# Patient Record
Sex: FEMALE | Race: WHITE | Employment: UNEMPLOYED | URBAN - METROPOLITAN AREA
[De-identification: names, ages, dates, MRNs, and addresses within clinical notes are randomized per-mention and may not be internally consistent; named-entity substitution may affect disease eponyms.]

---
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
## 2018-05-23 ENCOUNTER — ROUTINE PRENATAL (OUTPATIENT)
Dept: OBGYN CLINIC | Age: 18
End: 2018-05-23
Payer: COMMERCIAL

## 2018-05-23 VITALS
HEART RATE: 102 BPM | WEIGHT: 175 LBS | SYSTOLIC BLOOD PRESSURE: 92 MMHG | DIASTOLIC BLOOD PRESSURE: 62 MMHG | BODY MASS INDEX: 32.01 KG/M2

## 2018-05-23 DIAGNOSIS — O46.8X2 SUBCHORIONIC HEMATOMA IN SECOND TRIMESTER, SINGLE OR UNSPECIFIED FETUS: ICD-10-CM

## 2018-05-23 DIAGNOSIS — Z36.89 ENCOUNTER FOR FETAL ANATOMIC SURVEY: ICD-10-CM

## 2018-05-23 DIAGNOSIS — Z3A.28 28 WEEKS GESTATION OF PREGNANCY: ICD-10-CM

## 2018-05-23 DIAGNOSIS — O36.63X0 EXCESSIVE FETAL GROWTH AFFECTING MANAGEMENT OF MOTHER IN SINGLETON PREGNANCY IN THIRD TRIMESTER, ANTEPARTUM: ICD-10-CM

## 2018-05-23 DIAGNOSIS — O41.8X20 SUBCHORIONIC HEMATOMA IN SECOND TRIMESTER, SINGLE OR UNSPECIFIED FETUS: ICD-10-CM

## 2018-05-23 DIAGNOSIS — O99.213 OBESITY AFFECTING PREGNANCY IN THIRD TRIMESTER: Primary | ICD-10-CM

## 2018-05-23 LAB
GLUCOSE URINE, POC: NORMAL
PROTEIN UA: POSITIVE

## 2018-05-23 PROCEDURE — 99214 OFFICE O/P EST MOD 30 MIN: CPT | Performed by: OBSTETRICS & GYNECOLOGY

## 2018-05-23 PROCEDURE — 1036F TOBACCO NON-USER: CPT | Performed by: OBSTETRICS & GYNECOLOGY

## 2018-05-23 PROCEDURE — 99211 OFF/OP EST MAY X REQ PHY/QHP: CPT | Performed by: OBSTETRICS & GYNECOLOGY

## 2018-05-23 PROCEDURE — 81002 URINALYSIS NONAUTO W/O SCOPE: CPT | Performed by: OBSTETRICS & GYNECOLOGY

## 2018-05-23 PROCEDURE — G8417 CALC BMI ABV UP PARAM F/U: HCPCS | Performed by: OBSTETRICS & GYNECOLOGY

## 2018-05-23 PROCEDURE — 76819 FETAL BIOPHYS PROFIL W/O NST: CPT | Performed by: OBSTETRICS & GYNECOLOGY

## 2018-05-23 PROCEDURE — G8427 DOCREV CUR MEDS BY ELIG CLIN: HCPCS | Performed by: OBSTETRICS & GYNECOLOGY

## 2018-05-23 PROCEDURE — 76811 OB US DETAILED SNGL FETUS: CPT | Performed by: OBSTETRICS & GYNECOLOGY

## 2018-05-24 ENCOUNTER — HOSPITAL ENCOUNTER (OUTPATIENT)
Age: 18
Discharge: HOME | End: 2018-05-24
Payer: COMMERCIAL

## 2018-05-24 DIAGNOSIS — Z3A.28 28 WEEKS GESTATION OF PREGNANCY: ICD-10-CM

## 2018-05-24 DIAGNOSIS — O99.213 OBESITY AFFECTING PREGNANCY IN THIRD TRIMESTER: ICD-10-CM

## 2018-05-24 LAB
GLUCOSE TOLERANCE TEST 1 HOUR: 156 MG/DL
GLUCOSE TOLERANCE TEST 2 HOUR: 105 MG/DL
GLUCOSE TOLERANCE TEST FASTING: 83 MG/DL

## 2018-05-24 PROCEDURE — 36415 COLL VENOUS BLD VENIPUNCTURE: CPT

## 2018-05-24 PROCEDURE — 82951 GLUCOSE TOLERANCE TEST (GTT): CPT

## 2018-06-13 ENCOUNTER — ROUTINE PRENATAL (OUTPATIENT)
Dept: OBGYN CLINIC | Age: 18
End: 2018-06-13
Payer: COMMERCIAL

## 2018-06-13 VITALS
HEART RATE: 112 BPM | DIASTOLIC BLOOD PRESSURE: 79 MMHG | BODY MASS INDEX: 32.19 KG/M2 | WEIGHT: 176 LBS | SYSTOLIC BLOOD PRESSURE: 119 MMHG

## 2018-06-13 DIAGNOSIS — O99.213 OBESITY AFFECTING PREGNANCY IN THIRD TRIMESTER: ICD-10-CM

## 2018-06-13 DIAGNOSIS — Z3A.31 31 WEEKS GESTATION OF PREGNANCY: ICD-10-CM

## 2018-06-13 DIAGNOSIS — O46.8X1 SUBCHORIONIC HEMATOMA IN FIRST TRIMESTER, SINGLE OR UNSPECIFIED FETUS: ICD-10-CM

## 2018-06-13 DIAGNOSIS — O41.8X10 SUBCHORIONIC HEMATOMA IN FIRST TRIMESTER, SINGLE OR UNSPECIFIED FETUS: ICD-10-CM

## 2018-06-13 DIAGNOSIS — O36.63X0 EXCESSIVE FETAL GROWTH AFFECTING MANAGEMENT OF MOTHER IN SINGLETON PREGNANCY IN THIRD TRIMESTER, ANTEPARTUM: Primary | ICD-10-CM

## 2018-06-13 LAB
GLUCOSE URINE, POC: NORMAL
PROTEIN UA: POSITIVE

## 2018-06-13 PROCEDURE — 76818 FETAL BIOPHYS PROFILE W/NST: CPT | Performed by: OBSTETRICS & GYNECOLOGY

## 2018-06-13 PROCEDURE — 99214 OFFICE O/P EST MOD 30 MIN: CPT | Performed by: OBSTETRICS & GYNECOLOGY

## 2018-06-13 PROCEDURE — G8417 CALC BMI ABV UP PARAM F/U: HCPCS | Performed by: OBSTETRICS & GYNECOLOGY

## 2018-06-13 PROCEDURE — 76816 OB US FOLLOW-UP PER FETUS: CPT | Performed by: OBSTETRICS & GYNECOLOGY

## 2018-06-13 PROCEDURE — 99211 OFF/OP EST MAY X REQ PHY/QHP: CPT | Performed by: OBSTETRICS & GYNECOLOGY

## 2018-06-13 PROCEDURE — 1036F TOBACCO NON-USER: CPT | Performed by: OBSTETRICS & GYNECOLOGY

## 2018-06-13 PROCEDURE — G8427 DOCREV CUR MEDS BY ELIG CLIN: HCPCS | Performed by: OBSTETRICS & GYNECOLOGY

## 2018-06-13 PROCEDURE — 81002 URINALYSIS NONAUTO W/O SCOPE: CPT | Performed by: OBSTETRICS & GYNECOLOGY

## 2018-07-02 ENCOUNTER — ROUTINE PRENATAL (OUTPATIENT)
Dept: OBGYN CLINIC | Age: 18
End: 2018-07-02
Payer: COMMERCIAL

## 2018-07-02 VITALS
BODY MASS INDEX: 32.76 KG/M2 | SYSTOLIC BLOOD PRESSURE: 110 MMHG | HEIGHT: 62 IN | DIASTOLIC BLOOD PRESSURE: 73 MMHG | HEART RATE: 98 BPM | WEIGHT: 178 LBS

## 2018-07-02 DIAGNOSIS — O46.8X1 SUBCHORIONIC HEMATOMA IN FIRST TRIMESTER, SINGLE OR UNSPECIFIED FETUS: ICD-10-CM

## 2018-07-02 DIAGNOSIS — O41.8X10 SUBCHORIONIC HEMATOMA IN FIRST TRIMESTER, SINGLE OR UNSPECIFIED FETUS: ICD-10-CM

## 2018-07-02 DIAGNOSIS — O36.63X0 EXCESSIVE FETAL GROWTH AFFECTING MANAGEMENT OF MOTHER IN SINGLETON PREGNANCY IN THIRD TRIMESTER, ANTEPARTUM: ICD-10-CM

## 2018-07-02 DIAGNOSIS — Z3A.34 34 WEEKS GESTATION OF PREGNANCY: ICD-10-CM

## 2018-07-02 DIAGNOSIS — O99.213 OBESITY AFFECTING PREGNANCY IN THIRD TRIMESTER: Primary | ICD-10-CM

## 2018-07-02 LAB
GLUCOSE URINE, POC: NEGATIVE
PROTEIN UA: NEGATIVE

## 2018-07-02 PROCEDURE — 1036F TOBACCO NON-USER: CPT | Performed by: OBSTETRICS & GYNECOLOGY

## 2018-07-02 PROCEDURE — 76815 OB US LIMITED FETUS(S): CPT | Performed by: OBSTETRICS & GYNECOLOGY

## 2018-07-02 PROCEDURE — 99211 OFF/OP EST MAY X REQ PHY/QHP: CPT | Performed by: OBSTETRICS & GYNECOLOGY

## 2018-07-02 PROCEDURE — G8417 CALC BMI ABV UP PARAM F/U: HCPCS | Performed by: OBSTETRICS & GYNECOLOGY

## 2018-07-02 PROCEDURE — G8427 DOCREV CUR MEDS BY ELIG CLIN: HCPCS | Performed by: OBSTETRICS & GYNECOLOGY

## 2018-07-02 PROCEDURE — 81002 URINALYSIS NONAUTO W/O SCOPE: CPT | Performed by: OBSTETRICS & GYNECOLOGY

## 2018-07-02 PROCEDURE — 99213 OFFICE O/P EST LOW 20 MIN: CPT | Performed by: OBSTETRICS & GYNECOLOGY

## 2018-07-02 PROCEDURE — 76818 FETAL BIOPHYS PROFILE W/NST: CPT | Performed by: OBSTETRICS & GYNECOLOGY

## 2018-07-02 NOTE — LETTER
18     RE:  Rose Heard   : 2000   AGE: 21 y.o. MD Aleja Egan MD       [address]  250 [facility], [facility]    Dear Dutch Kilgore and Erik:  Mrs. Rose Heard a 21 y.o.  [id number]  is seen today on follow up in our office. REASON FOR APPOINTMENT:  1. To check on fetal well being anatomy and growth. 2. Vaginal Bleeding, and Subchorionic Hematoma, noted on 7 weeks ultrasound. 3. Previous pregnancy loss at 25 weeks (complicated by subchorionic hematoma and elevated MSAFP). MEDICATIONS:    Prenatal Vitamins    INTERVAL HISTORY:  Mrs Rose Heard had an uneventful course of pregnancy since her last visit to our office. When seen today in our office she had no complaints. PHYSICAL EXAMINATION:  General Appearance:  Healthy looking, alert, no acute distress. Eyes:     No pallor, no icterus, no photophobia. Ears:     No ear drainage. Nose:     No nasal drainage, no paranasal sinus tenderness. Throat:   Mucosa moist, no oral thrush, no exudate. Neck:     No nuchal rigidity. Back:     No CVA tenderness. Abdomen:    Soft nontender. Extremities:    No pretibial pitting edema, no calf muscle tenderness. Skin:     No rashes, no lesions. BP: 110/73 Weight: 178 lb (80.7 kg) Height: 5' 2\" (157.5 cm) Pulse: 98     Body mass index is 32.56 kg/m². Urine dipstick:  Glucose : Negative   Albumin:  Trace       An ultrasound evaluation was done in our office today. Please refer to the enclosed copy of the ultrasound report for further information. RE:  Rose Heard                      Page: 2  18    IMPRESSION:  1. A  34w2d  intrauterine gestation. 2. Maternal obesity. 3. Excessive fetal growth. 4. Previous Subchorionic Hematoma, resolved. 5. Previous pregnancy loss at 25 weeks (complicated by subchorionic hematoma and elevated MSAFP).     RECOMMENDATIONS/PLAN:  I discussed with the patient the following points:

## 2018-07-02 NOTE — PATIENT INSTRUCTIONS
Any questions contact [place] at [phone]. Call your primary obstetrician with bleeding, leaking of fluid, abdominal tenderness, headache, blurry vision, epigastric pain and increased urinary frequency. Do kick counts after dinner. Call your primary obstetrician if less than 10 kicks in 2 hours after dinner. Call your primary obstetrician with bleeding, leaking of fluid, abdominal tenderness, headache, blurry vision, epigastric pain and increased urinary frequency. Patient Education        Weeks 34 to 39 of Your Pregnancy: Care Instructions  Your Care Instructions    By now, your baby and your belly have grown quite large. It is almost time to give birth. A full-term pregnancy can deliver between 37 and 42 weeks. Your baby's lungs are almost ready to breathe air. The bones in your baby's head are now firm enough to protect it, but soft enough to move down through the birth canal.  You may feel excited, happy, anxious, or scared. You may wonder how you will know if you are in labor or what to expect during labor. Try to be flexible in your expectations of the birth. Because each birth is different, there is no way to know exactly what childbirth will be like for you. This care sheet will help you know what to expect and how to prepare. This may make your childbirth easier. If you haven't already had the Tdap shot during this pregnancy, talk to your doctor about getting it. It will help protect your  against pertussis infection. In the 36th week, most women have a test for group B streptococcus (GBS). GBS is a common bacteria that can live in the vagina and rectum. It can make your baby sick after birth. If you test positive, you will get antibiotics during labor. The medicine will keep your baby from getting the bacteria. Follow-up care is a key part of your treatment and safety. Be sure to make and go to all appointments, and call your doctor if you are having problems.  It's also a good idea to know your test results and keep a list of the medicines you take. How can you care for yourself at home? Learn about pain relief choices  · Pain is different for every woman. Talk with your doctor about your feelings about pain. · You can choose from several types of pain relief. These include medicine or breathing techniques, as well as comfort measures. You can use more than one option. · If you choose to have pain medicine during labor, talk to your doctor about your options. Some medicines lower anxiety and help with some of the pain. Others make your lower body numb so that you won't feel pain. · Be sure to tell your doctor about your pain medicine choice before you start labor or very early in your labor. You may be able to change your mind as labor progresses. · Rarely, a woman is put to sleep by medicine given through a mask or an IV. Labor and delivery  · The first stage of labor has three parts: early, active, and transition. ¨ Most women have early labor at home. You can stay busy or rest, eat light snacks, drink clear fluids, and start counting contractions. ¨ When talking during a contraction gets hard, you may be moving to active labor. During active labor, you should head for the hospital if you are not there already. ¨ You are in active labor when contractions come every 3 to 4 minutes and last about 60 seconds. Your cervix is opening more rapidly. ¨ If your water breaks, contractions will come faster and stronger. ¨ During transition, your cervix is stretching, and contractions are coming more rapidly. ¨ You may want to push, but your cervix might not be ready. Your doctor will tell you when to push. · The second stage starts when your cervix is completely opened and you are ready to push. ¨ Contractions are very strong to push the baby down the birth canal.  ¨ You will feel the urge to push. You may feel like you need to have a bowel movement.   ¨ You may be coached to push with contractions. These contractions will be very strong, but you will not have them as often. You can get a little rest between contractions. ¨ You may be emotional and irritable. You may not be aware of what is going on around you. ¨ One last push, and your baby is born. · The third stage is when a few more contractions push out the placenta. This may take 30 minutes or less. · The fourth stage is the welcome recovery. You may feel overwhelmed with emotions and exhausted but alert. This is a good time to start breastfeeding. Where can you learn more? Go to [email or web address] and sign in to your Blockchain account. Enter M088 in the [facility] box to learn more about \"Weeks 34 to 36 of Your Pregnancy: Care Instructions. \"     If you do not have an account, please click on the \"Sign Up Now\" link. Current as of: November 21, 2017  Content Version: 11.6  © 4105-8406 [employer]. Care instructions adapted under license by [facility] ([facility]). If you have questions about a medical condition or this instruction, always ask your healthcare professional. [place] [address] any warranty or liability for your use of this information. Patient Education        Learning About When to Call Your Doctor During Pregnancy (After 20 Weeks)  Your Care Instructions  It's common to have concerns about what might be a problem during pregnancy. Although most pregnant women don't have any serious problems, it's important to know when to call your doctor if you have certain symptoms or signs of labor. These are general suggestions. Your doctor may give you some more information about when to call. When to call your doctor (after 20 weeks)  Call 911 anytime you think you may need emergency care. For example, call if:  · You have severe vaginal bleeding. · You have sudden, severe pain in your belly. · You passed out (lost consciousness). · You have a seizure.   · You see or feel the umbilical cord. · You think you are about to deliver your baby and can't make it safely to the hospital.  Call your doctor now or seek immediate medical care if:  · You have vaginal bleeding. · You have belly pain. · You have a fever. · You have symptoms of preeclampsia, such as:  ¨ Sudden swelling of your face, hands, or feet. ¨ New vision problems (such as dimness or blurring). ¨ A severe headache. · You have a sudden release of fluid from your vagina. (You think your water broke.)  · You think that you may be in labor. This means that you've had at least 4 contractions within 20 minutes or at least 8 contractions in an hour. · You notice that your baby has stopped moving or is moving much less than normal.  · You have symptoms of a urinary tract infection. These may include:  ¨ Pain or burning when you urinate. ¨ A frequent need to urinate without being able to pass much urine. ¨ Pain in the flank, which is just below the rib cage and above the waist on either side of the back. ¨ Blood in your urine. Watch closely for changes in your health, and be sure to contact your doctor if:  · You have vaginal discharge that smells bad. · You have skin changes, such as:  ¨ A rash. ¨ Itching. ¨ Yellow color to your skin. · You have other concerns about your pregnancy. If you have labor signs at 37 weeks or more  If you have signs of labor at 37 weeks or more, your doctor may tell you to call when your labor becomes more active. Symptoms of active labor include:  · Contractions that are regular. · Contractions that are less than 5 minutes apart. · Contractions that are hard to talk through. Follow-up care is a key part of your treatment and safety. Be sure to make and go to all appointments, and call your doctor if you are having problems. It's also a good idea to know your test results and keep a list of the medicines you take. Where can you learn more?   Go to [email or web address] and sign in to your Research & Innovation account. Enter  in the [facility] box to learn more about \"Learning About When to Call Your Doctor During Pregnancy (After 20 Weeks). \"     If you do not have an account, please click on the \"Sign Up Now\" link. Current as of: November 21, 2017  Content Version: 11.6  © 5225-0760 [employer]. Care instructions adapted under license by [facility] ([facility]). If you have questions about a medical condition or this instruction, always ask your healthcare professional. [place] [address] any warranty or liability for your use of this information. Patient Education        Counting Your Baby's Kicks: Care Instructions  Your Care Instructions    Counting your baby's kicks is one way your doctor can tell that your baby is healthy. Most women-especially in a first pregnancy-feel their baby move for the first time between 16 and 22 weeks. The movement may feel like flutters rather than kicks. Your baby may move more at certain times of the day. When you are active, you may notice less kicking than when you are resting. At your prenatal visits, your doctor will ask whether the baby is active. In your last trimester, your doctor may ask you to count the number of times you feel your baby move. Follow-up care is a key part of your treatment and safety. Be sure to make and go to all appointments, and call your doctor if you are having problems. It's also a good idea to know your test results and keep a list of the medicines you take. How do you count fetal kicks? · A common method of checking your baby's movement is to count the number of kicks or moves you feel in 1 hour. Ten movements (such as kicks, flutters, or rolls) in 1 hour are normal. Some doctors suggest that you count in the morning until you get to 10 movements. Then you can quit for that day and start again the next day.   · Pick your baby's most active

## 2018-07-03 NOTE — PROGRESS NOTES
NST completed. Baseline 140 with moderate variability+ accelerations.  Reactive per dr Mishel Roe
Previous subchorionic hematoma. INDICATION:              Previous Intrauterine fetal demise      TIME ON:  1:31 pm      TIME OFF:  1:52 PM      RESULT:   REACTIVE      FHR Baseline Rate:   140 bpm    PERIODIC CHANGES:    · Accelerations present, variability moderate, no decelerations noted    COMMENTS:      She should be monitored with nonstress tests every 3-4 days for remainder of pregnancy. Should be seen in our MFM office on follow-up in two weeks.         Ana Cristina Julian MD

## 2018-07-16 ENCOUNTER — ROUTINE PRENATAL (OUTPATIENT)
Dept: OBGYN CLINIC | Age: 18
End: 2018-07-16
Payer: COMMERCIAL

## 2018-07-16 VITALS
HEART RATE: 101 BPM | SYSTOLIC BLOOD PRESSURE: 108 MMHG | BODY MASS INDEX: 33.68 KG/M2 | DIASTOLIC BLOOD PRESSURE: 72 MMHG | HEIGHT: 62 IN | WEIGHT: 183 LBS

## 2018-07-16 DIAGNOSIS — O36.63X0 EXCESSIVE FETAL GROWTH AFFECTING MANAGEMENT OF MOTHER IN SINGLETON PREGNANCY IN THIRD TRIMESTER, ANTEPARTUM: ICD-10-CM

## 2018-07-16 DIAGNOSIS — O46.8X1 SUBCHORIONIC HEMATOMA IN FIRST TRIMESTER, SINGLE OR UNSPECIFIED FETUS: ICD-10-CM

## 2018-07-16 DIAGNOSIS — O99.213 OBESITY AFFECTING PREGNANCY IN THIRD TRIMESTER: Primary | ICD-10-CM

## 2018-07-16 DIAGNOSIS — Z3A.36 36 WEEKS GESTATION OF PREGNANCY: ICD-10-CM

## 2018-07-16 DIAGNOSIS — O41.8X10 SUBCHORIONIC HEMATOMA IN FIRST TRIMESTER, SINGLE OR UNSPECIFIED FETUS: ICD-10-CM

## 2018-07-16 LAB
GLUCOSE URINE, POC: NEGATIVE
PROTEIN UA: POSITIVE

## 2018-07-16 PROCEDURE — G8427 DOCREV CUR MEDS BY ELIG CLIN: HCPCS | Performed by: OBSTETRICS & GYNECOLOGY

## 2018-07-16 PROCEDURE — 1036F TOBACCO NON-USER: CPT | Performed by: OBSTETRICS & GYNECOLOGY

## 2018-07-16 PROCEDURE — 81002 URINALYSIS NONAUTO W/O SCOPE: CPT | Performed by: OBSTETRICS & GYNECOLOGY

## 2018-07-16 PROCEDURE — 99213 OFFICE O/P EST LOW 20 MIN: CPT | Performed by: OBSTETRICS & GYNECOLOGY

## 2018-07-16 PROCEDURE — 76816 OB US FOLLOW-UP PER FETUS: CPT | Performed by: OBSTETRICS & GYNECOLOGY

## 2018-07-16 PROCEDURE — G8417 CALC BMI ABV UP PARAM F/U: HCPCS | Performed by: OBSTETRICS & GYNECOLOGY

## 2018-07-16 PROCEDURE — 99211 OFF/OP EST MAY X REQ PHY/QHP: CPT | Performed by: OBSTETRICS & GYNECOLOGY

## 2018-07-16 PROCEDURE — 76818 FETAL BIOPHYS PROFILE W/NST: CPT | Performed by: OBSTETRICS & GYNECOLOGY

## 2018-07-16 NOTE — PATIENT INSTRUCTIONS
[email or web address] and sign in to your nCino account. Enter  in the [facility] box to learn more about \"Learning About When to Call Your Doctor During Pregnancy (After 20 Weeks). \"     If you do not have an account, please click on the \"Sign Up Now\" link. Current as of: November 21, 2017  Content Version: 11.6  © 2094-4947 [employer]. Care instructions adapted under license by [facility] ([facility]). If you have questions about a medical condition or this instruction, always ask your healthcare professional. [place] [address] any warranty or liability for your use of this information. Patient Education        Counting Your Baby's Kicks: Care Instructions  Your Care Instructions    Counting your baby's kicks is one way your doctor can tell that your baby is healthy. Most women-especially in a first pregnancy-feel their baby move for the first time between 16 and 22 weeks. The movement may feel like flutters rather than kicks. Your baby may move more at certain times of the day. When you are active, you may notice less kicking than when you are resting. At your prenatal visits, your doctor will ask whether the baby is active. In your last trimester, your doctor may ask you to count the number of times you feel your baby move. Follow-up care is a key part of your treatment and safety. Be sure to make and go to all appointments, and call your doctor if you are having problems. It's also a good idea to know your test results and keep a list of the medicines you take. How do you count fetal kicks? · A common method of checking your baby's movement is to count the number of kicks or moves you feel in 1 hour. Ten movements (such as kicks, flutters, or rolls) in 1 hour are normal. Some doctors suggest that you count in the morning until you get to 10 movements. Then you can quit for that day and start again the next day.   · Pick your baby's most active time of day to count. This may be any time from morning to evening. · If you do not feel 10 movements in an hour, your baby may be sleeping. Wait for the next hour and count again. When should you call for help? Call your doctor now or seek immediate medical care if:    · You noticed that your baby has stopped moving or is moving much less than normal.    Watch closely for changes in your health, and be sure to contact your doctor if you have any problems. Where can you learn more? Go to [email or web address] and sign in to your Clinician Therapeutics account. Enter M658 in the [employer] box to learn more about \"Counting Your Baby's Kicks: Care Instructions. \"     If you do not have an account, please click on the \"Sign Up Now\" link. Current as of: November 21, 2017  Content Version: 11.6  © 3123-1498 [employer]. Care instructions adapted under license by [facility] ([facility]). If you have questions about a medical condition or this instruction, always ask your healthcare professional. [address] any warranty or liability for your use of this information.

## 2018-07-16 NOTE — LETTER
18     RE:  Gregor Martin   : 2000   AGE: 21 y.o. MD Riccardo Strickland MD       [address]    Dear Dutch Kilgore and Erik:  Mrs. Gregor Martin a 21 y.o.  [id number]  is seen today on follow up in our office. REASON FOR APPOINTMENT:  1. To check on fetal well being anatomy and growth. 2. Maternal obesity. 3. Vaginal Bleeding, and Subchorionic Hematoma, noted on 7 weeks ultrasound. 4. Previous pregnancy loss at 25 weeks (complicated by subchorionic hematoma and elevated MSAFP). MEDICATIONS:    Prenatal Vitamins    INTERVAL HISTORY:  Mrs Gregor Martin had an uneventful course of pregnancy since her last visit to our office. When seen today in our office she had no complaints. PHYSICAL EXAMINATION:  General Appearance:  Healthy looking, alert, no acute distress. Eyes:     No pallor, no icterus, no photophobia. Ears:     No ear drainage. Nose:     No nasal drainage, no paranasal sinus tenderness. Throat:   Mucosa moist, no oral thrush, no exudate. Neck:     No nuchal rigidity. Back:     No CVA tenderness. Abdomen:    Soft nontender. Extremities:    No pretibial pitting edema, no calf muscle tenderness. Skin:     No rashes, no lesions. BP: 108/72 Weight: 183 lb (83 kg) Height: 5' 2\" (157.5 cm) Pulse: 101     Body mass index is 33.47 kg/m². Urine dipstick:  Glucose : Negative   Albumin:  Trace       An ultrasound evaluation was done in our office today. Please refer to the enclosed copy of the ultrasound report for further information. RE:  Gregor Martin                      Page: 2  18    IMPRESSION:  1. A  36w2d  intrauterine gestation. 2. Maternal obesity. 3. Excessive fetal growth. 4. Previous Subchorionic Hematoma, resolved. 5. Previous pregnancy loss at 25 weeks (complicated by subchorionic hematoma and elevated MSAFP).     RECOMMENDATIONS/PLAN: I discussed with the patient the following points:    1. The benefits and limitations of ultrasound in prenatal diagnosis. Some defects might not always be seen by ultrasound. Estimated incidence of these defects in the general population is 2- 4%. 2. No structural  anomalies are noted. Only genetic amniocentesis can rule out fetal chromosome anomalies. Normal ultrasound does not. 3. Estimated fetal weight of her baby is at the 18 9th percentile with fetal head circumference above 95th percentile for gestational age. 4. Obesity is assosiated with an increased risk of developing gestational diabetes, and disturbance in the growth of her baby, such as Large for dates and small for Dates. Gestational diabetes was ruled out with a negative two hour Glucose tolerance test on 5/24/2018.   5. Fetal well-being was confirmed today. The amount of fluid around baby is normal.  The Biophysical profile score of 10/10 is reassuring, and the umbilical artery Doppler studies are normal.  6. She should monitor fetal well-being at home by counting movements after dinner. Her baby should  move 10 times in 2 hours; otherwise, she should call your office immediately. She is also to call, if she develops any headaches, blurred vision, abdominal pain, bleeding, or spotting, which are signs of preeclampsia. Nakul Stanley 7. She is to continue to follow with you in your office for ongoing obstetric care. 8. Due to the fact that she gives history of previous intrauterine fetal demise, she should be monitored with nonstress test every Thursday in your office and with biophysical profiles and umbilical artery Dopplers once a week every Monday in our [facility] office. 9. I recommend delivery at 39 completed weeks or earlier if there is evidence of fetal jeopardy. Thank you again, , for allowing us to be of service to your patient. If I can be of further assistance, please do not hesitate to call.       Sincerely,

## 2018-07-16 NOTE — PROGRESS NOTES
18     RE:  Patrice Cain   : 2000   AGE: 21 y.o. MD Pilar Ravi MD       [address]    Dear Dutch Kilgore and Erik:  Mrs. Patrice Cain a 21 y.o.  [id number]  is seen today on follow up in our office. REASON FOR APPOINTMENT:  1. To check on fetal well being anatomy and growth. 2. Maternal obesity. 3. Vaginal Bleeding, and Subchorionic Hematoma, noted on 7 weeks ultrasound. 4. Previous pregnancy loss at 25 weeks (complicated by subchorionic hematoma and elevated MSAFP). MEDICATIONS:    Prenatal Vitamins    INTERVAL HISTORY:  Mrs Patrice Cain had an uneventful course of pregnancy since her last visit to our office. When seen today in our office she had no complaints. PHYSICAL EXAMINATION:  General Appearance:  Healthy looking, alert, no acute distress. Eyes:     No pallor, no icterus, no photophobia. Ears:     No ear drainage. Nose:     No nasal drainage, no paranasal sinus tenderness. Throat:   Mucosa moist, no oral thrush, no exudate. Neck:     No nuchal rigidity. Back:     No CVA tenderness. Abdomen:    Soft nontender. Extremities:    No pretibial pitting edema, no calf muscle tenderness. Skin:     No rashes, no lesions. BP: 108/72 Weight: 183 lb (83 kg) Height: 5' 2\" (157.5 cm) Pulse: 101     Body mass index is 33.47 kg/m². Urine dipstick:  Glucose : Negative   Albumin:  Trace       An ultrasound evaluation was done in our office today. Please refer to the enclosed copy of the ultrasound report for further information. IMPRESSION:  1. A  36w2d  intrauterine gestation. 2. Maternal obesity. 3. Excessive fetal growth. 4. Previous Subchorionic Hematoma, resolved. 5. Previous pregnancy loss at 25 weeks (complicated by subchorionic hematoma and elevated MSAFP). RECOMMENDATIONS/PLAN:  I discussed with the patient the following points:    1.  The benefits and limitations of ultrasound in prenatal diagnosis. Some defects might not always be seen by ultrasound. Estimated incidence of these defects in the general population is 2- 4%. 2. No structural  anomalies are noted. Only genetic amniocentesis can rule out fetal chromosome anomalies. Normal ultrasound does not. 3. Estimated fetal weight of her baby is at the 18 9th percentile with fetal head circumference above 95th percentile for gestational age. 4. Obesity is assosiated with an increased risk of developing gestational diabetes, and disturbance in the growth of her baby, such as Large for dates and small for Dates. Gestational diabetes was ruled out with a negative two hour Glucose tolerance test on 5/24/2018.   5. Fetal well-being was confirmed today. The amount of fluid around baby is normal.  The Biophysical profile score of 10/10 is reassuring, and the umbilical artery Doppler studies are normal.  6. She should monitor fetal well-being at home by counting movements after dinner. Her baby should  move 10 times in 2 hours; otherwise, she should call your office immediately. She is also to call, if she develops any headaches, blurred vision, abdominal pain, bleeding, or spotting, which are signs of preeclampsia. Arielle Copper 7. She is to continue to follow with you in your office for ongoing obstetric care. 8. Due to the fact that she gives history of previous intrauterine fetal demise, she should be monitored with nonstress test every Thursday in your office and with biophysical profiles and umbilical artery Dopplers once a week every Monday in our [facility] office. 9. I recommend delivery at 39 completed weeks or earlier if there is evidence of fetal jeopardy. Thank you again, doctor, for allowing us to be of service to your patient. If I can be of further assistance, please do not hesitate to call.       Sincerely,      Ezra Clement M.D., [facility]    Current encounter billing:  AL OFFICE OUTPATIENT VISIT 15 MINUTES [[id number]]  US OB Follow Up Transabdominal Approach [JBS225 Custom]  Biophysical profile [OBO12 Custom]      **This report has been created using voice recognition software. It may contain minor errors     which are inherent in voice recognition technology**                  NON STRESS TEST INTERPRETATION    18    RE:  Suraj Salinas   : 2000   AGE: 21 y.o. GESTATIONAL AGE:  36w2d    DIAGNOSIS:               Maternal obesity. Previous Intrauterine fetal demise. Previous subchorionic hematoma.      INDICATION:              Previous Intrauterine fetal demise      TIME ON:  10:36 am      TIME OFF:  11:04 am      RESULT:   REACTIVE      FHR Baseline Rate:   140 bpm    PERIODIC CHANGES:    · Accelerations present, variability moderate, no decelerations noted    COMMENTS:      She is to continue having NST's every 3-4 days, and BPP with umbilical artery doppler studies once per week        Charley Aaron MD

## 2018-07-17 ENCOUNTER — TELEPHONE (OUTPATIENT)
Dept: OBGYN CLINIC | Age: 18
End: 2018-07-17

## 2018-07-17 NOTE — TELEPHONE ENCOUNTER
Patient's  called Dr. Harman Ochoa office stating he could not bring Soo to any more appointments because his job transferred him to [place] and He wants her induced so he can start his medical leave and then he would not be transferred to [place] until after the medical leave. Dr. Harman Ochoa does not want to induce her at 37 weeks. He also cannot schedule NST or any other appointments because of transporation. There is no family or friends. Only one car. Call Dr. Harman Ochoa for further discussion regarding this patient.

## 2018-07-23 ENCOUNTER — ROUTINE PRENATAL (OUTPATIENT)
Dept: OBGYN CLINIC | Age: 18
End: 2018-07-23
Payer: COMMERCIAL

## 2018-07-23 VITALS
BODY MASS INDEX: 33.47 KG/M2 | HEART RATE: 104 BPM | WEIGHT: 183 LBS | DIASTOLIC BLOOD PRESSURE: 86 MMHG | SYSTOLIC BLOOD PRESSURE: 127 MMHG

## 2018-07-23 DIAGNOSIS — Z3A.37 37 WEEKS GESTATION OF PREGNANCY: ICD-10-CM

## 2018-07-23 DIAGNOSIS — O41.8X20 SUBCHORIONIC HEMATOMA IN SECOND TRIMESTER, SINGLE OR UNSPECIFIED FETUS: ICD-10-CM

## 2018-07-23 DIAGNOSIS — O99.213 OBESITY AFFECTING PREGNANCY IN THIRD TRIMESTER: Primary | ICD-10-CM

## 2018-07-23 DIAGNOSIS — O46.8X2 SUBCHORIONIC HEMATOMA IN SECOND TRIMESTER, SINGLE OR UNSPECIFIED FETUS: ICD-10-CM

## 2018-07-23 DIAGNOSIS — O36.63X0 EXCESSIVE FETAL GROWTH AFFECTING MANAGEMENT OF MOTHER IN SINGLETON PREGNANCY IN THIRD TRIMESTER, ANTEPARTUM: ICD-10-CM

## 2018-07-23 LAB
GLUCOSE URINE, POC: NORMAL
PROTEIN UA: NEGATIVE

## 2018-07-23 PROCEDURE — 76818 FETAL BIOPHYS PROFILE W/NST: CPT | Performed by: OBSTETRICS & GYNECOLOGY

## 2018-07-23 PROCEDURE — 99211 OFF/OP EST MAY X REQ PHY/QHP: CPT | Performed by: OBSTETRICS & GYNECOLOGY

## 2018-07-23 PROCEDURE — G8417 CALC BMI ABV UP PARAM F/U: HCPCS | Performed by: OBSTETRICS & GYNECOLOGY

## 2018-07-23 PROCEDURE — 81002 URINALYSIS NONAUTO W/O SCOPE: CPT | Performed by: OBSTETRICS & GYNECOLOGY

## 2018-07-23 PROCEDURE — 1036F TOBACCO NON-USER: CPT | Performed by: OBSTETRICS & GYNECOLOGY

## 2018-07-23 PROCEDURE — 99213 OFFICE O/P EST LOW 20 MIN: CPT | Performed by: OBSTETRICS & GYNECOLOGY

## 2018-07-23 PROCEDURE — 76816 OB US FOLLOW-UP PER FETUS: CPT | Performed by: OBSTETRICS & GYNECOLOGY

## 2018-07-23 PROCEDURE — G8427 DOCREV CUR MEDS BY ELIG CLIN: HCPCS | Performed by: OBSTETRICS & GYNECOLOGY

## 2018-07-23 NOTE — PATIENT INSTRUCTIONS
together when you breastfeed. · You may want to learn how to use a breast pump and store your milk. · If you choose to bottle feed, make the feeding feel like breastfeeding so you can bond with your baby. Always hold your baby and the bottle. Do not prop bottles or let your baby fall asleep with a bottle. Learn about crying  · It is common for babies to cry for 1 to 3 hours a day. Some cry more, some cry less. · Babies don't cry to make you upset or because you are a bad parent. · Crying is how your baby communicates. Your baby may be hungry; have gas; need a diaper change; or feel cold, warm, tired, lonely, or tense. Sometimes babies cry for unknown reasons. · If you respond to your baby's needs, he or she will learn to trust you. · Try to stay calm when your baby cries. Your baby may get more upset if he or she senses that you are upset. Know how to care for your   · Your baby's umbilical cord stump will drop off on its own, usually between 1 and 2 weeks. To care for your baby's umbilical cord area:  ¨ Clean the area at the bottom of the cord 2 or 3 times a day. ¨ Pay special attention to the area where the cord attaches to the skin. ¨ Keep the diaper folded below the cord. ¨ Use a damp washcloth or cotton ball to sponge bathe your baby until the stump has come off. · Your baby's first dark stool is called meconium. After the meconium is passed, your baby will develop his or her own bowel pattern. ¨ Some babies, especially  babies, have several bowel movements a day. Others have one or two a day, or one every 2 to 3 days. ¨  babies often have loose, yellow stools. Formula-fed babies have more formed stools. ¨ If your baby's stools look like little pellets, he or she is constipated. After 2 days of constipation, call your baby's doctor. · If your baby will be circumcised, you can care for him at home. ¨ Gently rinse his penis with warm water after every diaper change.  Do not try to remove the film that forms on the penis. This film will go away on its own. Pat dry. ¨ Put petroleum ointment, such as Vaseline, on the area of the diaper that will touch your baby's penis. This will keep the diaper from sticking to your baby. ¨ Ask the doctor about giving your baby acetaminophen (Tylenol) for pain. Where can you learn more? Go to https://chpekatherinewarslan.healthActimo. org and sign in to your BrandBacker account. Enter 68 [id number] in the [employer] box to learn more about \"Week 37 of Your Pregnancy: Care Instructions. \"     If you do not have an account, please click on the \"Sign Up Now\" link. Current as of: November 21, 2017  Content Version: 11.6  © 4014-3578 [employer]. Care instructions adapted under license by [facility] ([facility]). If you have questions about a medical condition or this instruction, always ask your healthcare professional. [place] [address] any warranty or liability for your use of this information. Patient Education        Learning About When to Call Your Doctor During Pregnancy (After 20 Weeks)  Your Care Instructions  It's common to have concerns about what might be a problem during pregnancy. Although most pregnant women don't have any serious problems, it's important to know when to call your doctor if you have certain symptoms or signs of labor. These are general suggestions. Your doctor may give you some more information about when to call. When to call your doctor (after 20 weeks)  Call 911 anytime you think you may need emergency care. For example, call if:  · You have severe vaginal bleeding. · You have sudden, severe pain in your belly. · You passed out (lost consciousness). · You have a seizure. · You see or feel the umbilical cord. · You think you are about to deliver your baby and can't make it safely to the hospital.  Call your doctor now or seek immediate medical care if:  · You have vaginal bleeding.   · You an account, please click on the \"Sign Up Now\" link. Current as of: November 21, 2017  Content Version: 11.6  © 6276-3946 [employer]. Care instructions adapted under license by [employer] [facility] ([facility]). If you have questions about a medical condition or this instruction, always ask your healthcare professional. [address] any warranty or liability for your use of this information. Patient Education        Counting Your Baby's Kicks: Care Instructions  Your Care Instructions    Counting your baby's kicks is one way your doctor can tell that your baby is healthy. Most women-especially in a first pregnancy-feel their baby move for the first time between 16 and 22 weeks. The movement may feel like flutters rather than kicks. Your baby may move more at certain times of the day. When you are active, you may notice less kicking than when you are resting. At your prenatal visits, your doctor will ask whether the baby is active. In your last trimester, your doctor may ask you to count the number of times you feel your baby move. Follow-up care is a key part of your treatment and safety. Be sure to make and go to all appointments, and call your doctor if you are having problems. It's also a good idea to know your test results and keep a list of the medicines you take. How do you count fetal kicks? · A common method of checking your baby's movement is to count the number of kicks or moves you feel in 1 hour. Ten movements (such as kicks, flutters, or rolls) in 1 hour are normal. Some doctors suggest that you count in the morning until you get to 10 movements. Then you can quit for that day and start again the next day. · Pick your baby's most active time of day to count. This may be any time from morning to evening. · If you do not feel 10 movements in an hour, your baby may be sleeping. Wait for the next hour and count again. When should you call for help?   Call your doctor now or seek immediate medical care if:    · You noticed that your baby has stopped moving or is moving much less than normal.    Watch closely for changes in your health, and be sure to contact your doctor if you have any problems. Where can you learn more? Go to https://chpejose angeleb.Bauzaar. org and sign in to your [employer] account. Enter P333 in the [employer] box to learn more about \"Counting Your Baby's Kicks: Care Instructions. \"     If you do not have an account, please click on the \"Sign Up Now\" link. Current as of: November 21, 2017  Content Version: 11.6  © 0749-9910 [email or web address]. Care instructions adapted under license by [facility] ([facility]). If you have questions about a medical condition or this instruction, always ask your healthcare professional. [address] any warranty or liability for your use of this information.

## 2018-07-24 NOTE — PROGRESS NOTES
NST completed. Baseline 140 with moderate variability+ accelerations.  Reactive per dr Sherryle Quam
might not always be seen by ultrasound. Estimated incidence of these defects in the general population is 2- 4%. 2. No structural  anomalies are noted. Only genetic amniocentesis can rule out fetal chromosome anomalies. Normal ultrasound does not. 3. Estimated fetal weight of her baby is above the 90th percentile for gestational age. 4. Obesity is assosiated with an increased risk of developing gestational diabetes, and disturbance in the growth of her baby, such as Large for dates and small for Dates. Gestational diabetes was ruled out with a negative two hour Glucose tolerance test on 5/24/2018.   5. Fetal well-being was confirmed today. The amount of fluid around baby is normal.  The Biophysical profile score of 10/10 is reassuring, and the umbilical artery Doppler studies are normal.  6. She should monitor fetal well-being at home by counting movements after dinner. Her baby should  move 10 times in 2 hours; otherwise, she should call your office immediately. She is also to call, if she develops any headaches, blurred vision, abdominal pain, bleeding, or spotting, which are signs of preeclampsia. Bibi Noble 7. She is to continue to follow with you in your office for ongoing obstetric care, and should be followed up with nonstress tests every 3-4 days for remainder of pregnancy. 8. I recommend delivery at 39 completed weeks or earlier if there is evidence of fetal jeopardy. Thank you again, doctor, for allowing us to be of service to your patient. If I can be of further assistance, please do not hesitate to call. Sincerely,      Hayley Zhu M.D., [address] [facility]    Current encounter billing:  [place] OFFICE OUTPATIENT VISIT 15 MINUTES [[id number]]  US OB Follow Up Transabdominal Approach [[id number] Custom]  Biophysical profile [OBO12 Custom]    **This report has been created using voice recognition software.  It may contain minor errors     which are inherent in voice recognition technology**      7/23/18     RE:

## 2023-08-21 NOTE — LETTER
18     RE:  Becki Comment   : 2000   AGE: 21 y.o. Nicolina Sacramento, MD Elaine Phoenix, MD       [address] [facility], [address] [place] [address]    Dear Dutch Kilgore and Erik:  Mrs. Becki Kiser a 21 y.o.  [id number]  is seen today on follow up in our office. REASON FOR APPOINTMENT:  1. To check on fetal well being anatomy and growth. 2. Maternal obesity. 3. Vaginal Bleeding, and Subchorionic Hematoma, noted on 7 weeks ultrasound. 4. Previous pregnancy loss at 25 weeks (complicated by subchorionic hematoma and elevated MSAFP). MEDICATIONS:    Prenatal Vitamins    INTERVAL HISTORY:  Mrs Becki Kiser had an uneventful course of pregnancy since her last visit to our office. When seen today in our office she had no complaints. PHYSICAL EXAMINATION:  General Appearance:  Healthy looking, alert, no acute distress. Eyes:     No pallor, no icterus, no photophobia. Ears:     No ear drainage. Nose:     No nasal drainage, no paranasal sinus tenderness. Throat:   Mucosa moist, no oral thrush, no exudate. Neck:     No nuchal rigidity. Back:     No CVA tenderness. Abdomen:    Soft nontender. Extremities:    No pretibial pitting edema, no calf muscle tenderness. Skin:     No rashes, no lesions. BP: 127/86 Weight: 183 lb (83 kg)   Pulse: 104     Body mass index is 33.47 kg/m². Urine dipstick:  Glucose : Negative   Albumin:  Negative       An ultrasound evaluation was done in our office today. Please refer to the enclosed copy of the ultrasound report for further information. RE:  Becki Comment                    Page: 2  2018    IMPRESSION:  1. A  37w2d  intrauterine gestation. 2. Maternal obesity. 3. Excessive fetal growth. 4. Previous Subchorionic Hematoma, resolved. 5. Previous pregnancy loss at 25 weeks (complicated by subchorionic hematoma and elevated MSAFP).     RECOMMENDATIONS/PLAN:  I discussed with the patient the following points: 1. The benefits and limitations of ultrasound in prenatal diagnosis. Some defects might not always be seen by ultrasound. Estimated incidence of these defects in the general population is 2- 4%. 2. No structural  anomalies are noted. Only genetic amniocentesis can rule out fetal chromosome anomalies. Normal ultrasound does not. 3. Estimated fetal weight of her baby is above the 90th percentile for gestational age. 4. Obesity is assosiated with an increased risk of developing gestational diabetes, and disturbance in the growth of her baby, such as Large for dates and small for Dates. Gestational diabetes was ruled out with a negative two hour Glucose tolerance test on 5/24/2018.   5. Fetal well-being was confirmed today. The amount of fluid around baby is normal.  The Biophysical profile score of 10/10 is reassuring, and the umbilical artery Doppler studies are normal.  6. She should monitor fetal well-being at home by counting movements after dinner. Her baby should  move 10 times in 2 hours; otherwise, she should call your office immediately. She is also to call, if she develops any headaches, blurred vision, abdominal pain, bleeding, or spotting, which are signs of preeclampsia. Adriana Mckee 7. She is to continue to follow with you in your office for ongoing obstetric care, and should be followed up with nonstress tests every 3-4 days for remainder of pregnancy. 8. I recommend delivery at 39 completed weeks or earlier if there is evidence of fetal jeopardy. Thank you again, doctor, for allowing us to be of service to your patient. If I can be of further assistance, please do not hesitate to call. Sincerely,      Ashli Conrad M.D., [address] [facility]    Current encounter billing:  [place] OFFICE OUTPATIENT VISIT 15 MINUTES [[id number]]  US OB Follow Up Transabdominal Approach [[id number] Custom]  Biophysical profile [OBO12 Custom]    **This report has been created using voice recognition software.  It may No. ORESTES screening performed.  STOP BANG Legend: 0-2 = LOW Risk; 3-4 = INTERMEDIATE Risk; 5-8 = HIGH Risk